# Patient Record
Sex: FEMALE | Race: WHITE | NOT HISPANIC OR LATINO | Employment: UNEMPLOYED | URBAN - METROPOLITAN AREA
[De-identification: names, ages, dates, MRNs, and addresses within clinical notes are randomized per-mention and may not be internally consistent; named-entity substitution may affect disease eponyms.]

---

## 2017-07-11 ENCOUNTER — HISTORICAL (OUTPATIENT)
Dept: LAB | Facility: HOSPITAL | Age: 47
End: 2017-07-11

## 2017-07-11 ENCOUNTER — HISTORICAL (OUTPATIENT)
Dept: PREADMISSION TESTING | Facility: HOSPITAL | Age: 47
End: 2017-07-11

## 2017-07-11 LAB
ABS NEUT (OLG): 4.3 X10(3)/MCL (ref 2.1–9.2)
APTT PPP: 28.1 SECOND(S) (ref 20.6–36)
BASOPHILS # BLD AUTO: 0 X10(3)/MCL (ref 0–0.2)
BASOPHILS NFR BLD AUTO: 0 %
EOSINOPHIL # BLD AUTO: 0.2 X10(3)/MCL (ref 0–0.9)
EOSINOPHIL NFR BLD AUTO: 4 %
ERYTHROCYTE [DISTWIDTH] IN BLOOD BY AUTOMATED COUNT: 12.5 % (ref 11.5–17)
HCT VFR BLD AUTO: 42.4 % (ref 37–47)
HGB BLD-MCNC: 14 GM/DL (ref 12–16)
INR PPP: 0.99 (ref 0–1.27)
LYMPHOCYTES # BLD AUTO: 1.4 X10(3)/MCL (ref 0.6–4.6)
LYMPHOCYTES NFR BLD AUTO: 23 %
MCH RBC QN AUTO: 32 PG (ref 27–31)
MCHC RBC AUTO-ENTMCNC: 33 GM/DL (ref 33–36)
MCV RBC AUTO: 97 FL (ref 80–94)
MONOCYTES # BLD AUTO: 0.4 X10(3)/MCL (ref 0.1–1.3)
MONOCYTES NFR BLD AUTO: 6 %
NEUTROPHILS # BLD AUTO: 4.3 X10(3)/MCL (ref 2.1–9.2)
NEUTROPHILS NFR BLD AUTO: 68 %
PLATELET # BLD AUTO: 205 X10(3)/MCL (ref 130–400)
PMV BLD AUTO: 9.5 FL (ref 9.4–12.4)
PROTHROMBIN TIME: 12.9 SECOND(S) (ref 12.1–14.2)
RBC # BLD AUTO: 4.37 X10(6)/MCL (ref 4.2–5.4)
WBC # SPEC AUTO: 6.4 X10(3)/MCL (ref 4.5–11.5)

## 2017-07-14 ENCOUNTER — HISTORICAL (OUTPATIENT)
Dept: SURGERY | Facility: HOSPITAL | Age: 47
End: 2017-07-14

## 2017-12-01 ENCOUNTER — HISTORICAL (OUTPATIENT)
Dept: SURGERY | Facility: HOSPITAL | Age: 47
End: 2017-12-01

## 2018-01-29 ENCOUNTER — HISTORICAL (OUTPATIENT)
Dept: ADMINISTRATIVE | Facility: HOSPITAL | Age: 48
End: 2018-01-29

## 2019-05-09 ENCOUNTER — HISTORICAL (OUTPATIENT)
Dept: ADMINISTRATIVE | Facility: HOSPITAL | Age: 49
End: 2019-05-09

## 2022-04-10 ENCOUNTER — HISTORICAL (OUTPATIENT)
Dept: ADMINISTRATIVE | Facility: HOSPITAL | Age: 52
End: 2022-04-10

## 2022-04-26 VITALS
WEIGHT: 155 LBS | SYSTOLIC BLOOD PRESSURE: 113 MMHG | HEIGHT: 66 IN | BODY MASS INDEX: 24.91 KG/M2 | DIASTOLIC BLOOD PRESSURE: 67 MMHG

## 2022-04-29 NOTE — OP NOTE
DATE OF SURGERY:    12/01/2017    SURGEON:  Dima Doyle MD    PREOPERATIVE DIAGNOSES:    1. Lumbar spinal stenosis.  2. Lumbar radiculopathy.  3. Low back pain.    POSTOPERATIVE DIAGNOSES:    1. Lumbar spinal stenosis.  2. Lumbar radiculopathy.  3. Low back pain.    PROCEDURE:  L4-5 intralaminar epidural steroid injection.    INDICATIONS FOR PROCEDURE:  The patient is a 47-year-old female who presented to my clinic with complaints of a year-long history of back pain and left lower extremity radiculopathy, 8/10 at worst, 4 to 5/10 on average.  She had undergone an epidural steroid injection, medial branch block x2, radiofrequency ablation.  Despite this, she had persistent symptomatology.  The patient did make a correction of the date of surgery.  During her clinic visit, she said all of her pain was on the right.  However, the date of the injection, she said her pain was on the left.  Therefore, we aimed toward the left side of canal for the interlaminar epidural steroid injection.    PROCEDURE IN DETAIL:  After informed consent was obtained, the patient was brought to the operating room and placed prone on the procedure table.  Placed under anesthesia by the anesthesia team.  Appropriately padded, prepped, and draped in usual sterile fashion.       Began the procedure by, under live AP and lateral fluoroscopy, passing a 20 gauge Tuohy needle into the L4-5 interlaminar space using a loss of resistance technique.  I entered the epidural space.  Radiopaque contrast injection showed appropriate epidural flow pattern.  I then injected 4 cc of 4 mg/mL Decadron     solution mixed with 1 cc preservative-free saline into the epidural space.  The needle was withdrawn.  No apparent complications.        ______________________________  Dima Doyle MD    JT/UD  DD:  12/01/2017  Time:  11:06AM  DT:  12/01/2017  Time:  03:58PM  Job #:  [id number]

## 2022-04-29 NOTE — OP NOTE
DATE OF SURGERY:    07/14/2017    SURGEON:  Dima Doyle MD    PREOPERATIVE DIAGNOSES:    1. Low back pain.  2. Lumbar spinal stenosis.  3. Lumbar radiculopathy.  4. Lumbar degenerative disc disease.    POSTOPERATIVE DIAGNOSES:    1. Low back pain.  2. Lumbar spinal stenosis.  3. Lumbar radiculopathy.  4. Lumbar degenerative disc disease.    PROCEDURE:  L4-5 interlaminar epidural steroid injection.    INDICATION FOR PROCEDURE:  The patient is a 47-year-old female, who presented to my clinic with complaints of back pain and right lower extremity radiculopathy for 6-12 months, radiating down into her ankle region; 8/10 at worst, 6/10 on average.  The patient elected to undergo conservative management for treatment of her symptoms.    PROCEDURE IN DETAIL:  After informed consent was obtained, the patient was brought to the operating room and placed prone on the procedure table, placed under anesthesia by the anesthesia team, appropriately padded, prepped and draped in the usual sterile fashion.       I began the procedure by, under live AP and lateral fluoroscopy, passing a 20 gauge Tuohy needle into the L4-5 interlaminar space.  Using a loss of resistance technique, I entered the epidural space.  Radiopaque contrast injection showed appropriate right-sided epidural flow pattern.  I then injected 4 cc of 4 mg/mL     Decadron solution mixed with 1 mL preservative free saline into the epidural space.  The needle was withdrawn.  No apparent complications.      ______________________________  Dima Doyle MD    JT/UP  DD:  07/14/2017  Time:  02:17PM  DT:  07/16/2017  Time:  11:13AM  Job #:  [id number]

## 2022-12-05 ENCOUNTER — HOSPITAL ENCOUNTER (OUTPATIENT)
Dept: RADIOLOGY | Facility: HOSPITAL | Age: 52
Discharge: HOME | End: 2022-12-05
Payer: COMMERCIAL

## 2022-12-05 DIAGNOSIS — Z01.818 OTHER SPECIFIED PRE-OPERATIVE EXAMINATION: Primary | ICD-10-CM

## 2022-12-05 DIAGNOSIS — Z01.818 OTHER SPECIFIED PRE-OPERATIVE EXAMINATION: ICD-10-CM

## 2022-12-05 PROCEDURE — 71046 X-RAY EXAM CHEST 2 VIEWS: CPT | Mod: TC

## 2022-12-25 ENCOUNTER — PATIENT MESSAGE (OUTPATIENT)
Dept: ADMINISTRATIVE | Facility: OTHER | Age: 52
End: 2022-12-25
Payer: COMMERCIAL

## 2022-12-26 ENCOUNTER — PATIENT MESSAGE (OUTPATIENT)
Dept: ADMINISTRATIVE | Facility: OTHER | Age: 52
End: 2022-12-26
Payer: COMMERCIAL

## 2022-12-27 ENCOUNTER — PATIENT MESSAGE (OUTPATIENT)
Dept: ADMINISTRATIVE | Facility: OTHER | Age: 52
End: 2022-12-27
Payer: COMMERCIAL